# Patient Record
Sex: FEMALE | Race: BLACK OR AFRICAN AMERICAN | URBAN - METROPOLITAN AREA
[De-identification: names, ages, dates, MRNs, and addresses within clinical notes are randomized per-mention and may not be internally consistent; named-entity substitution may affect disease eponyms.]

---

## 2019-06-14 ENCOUNTER — HOSPITAL ENCOUNTER (OUTPATIENT)
Dept: MEDSURG UNIT | Facility: HOSPITAL | Age: 54
End: 2019-06-15
Attending: INTERNAL MEDICINE | Admitting: INTERNAL MEDICINE

## 2019-06-14 LAB
ABS NEUT (OLG): 6.26 X10(3)/MCL (ref 2.1–9.2)
ALBUMIN SERPL-MCNC: 4 GM/DL (ref 3.4–5)
ALBUMIN/GLOB SERPL: 0.9 RATIO (ref 1.1–2)
ALP SERPL-CCNC: 82 UNIT/L (ref 38–126)
ALT SERPL-CCNC: 27 UNIT/L (ref 12–78)
AST SERPL-CCNC: 45 UNIT/L (ref 15–37)
BASOPHILS # BLD AUTO: 0 X10(3)/MCL (ref 0–0.2)
BASOPHILS NFR BLD AUTO: 0 %
BILIRUB SERPL-MCNC: 0.4 MG/DL (ref 0.2–1)
BILIRUBIN DIRECT+TOT PNL SERPL-MCNC: 0.1 MG/DL (ref 0–0.5)
BILIRUBIN DIRECT+TOT PNL SERPL-MCNC: 0.3 MG/DL (ref 0–0.8)
BNP BLD-MCNC: 5 PG/ML (ref 0–125)
BUN SERPL-MCNC: 17 MG/DL (ref 7–18)
CALCIUM SERPL-MCNC: 9.3 MG/DL (ref 8.5–10.1)
CHLORIDE SERPL-SCNC: 105 MMOL/L (ref 98–107)
CO2 SERPL-SCNC: 26 MMOL/L (ref 21–32)
CREAT SERPL-MCNC: 0.8 MG/DL (ref 0.55–1.02)
EOSINOPHIL # BLD AUTO: 0.2 X10(3)/MCL (ref 0–0.9)
EOSINOPHIL NFR BLD AUTO: 2 %
ERYTHROCYTE [DISTWIDTH] IN BLOOD BY AUTOMATED COUNT: 14.6 % (ref 11.5–17)
GLOBULIN SER-MCNC: 4.3 GM/DL (ref 2.4–3.5)
GLUCOSE SERPL-MCNC: 105 MG/DL (ref 74–106)
HCT VFR BLD AUTO: 37.6 % (ref 37–47)
HGB BLD-MCNC: 11.6 GM/DL (ref 12–16)
LIPASE SERPL-CCNC: 142 UNIT/L (ref 73–393)
LYMPHOCYTES # BLD AUTO: 3.1 X10(3)/MCL (ref 0.6–4.6)
LYMPHOCYTES NFR BLD AUTO: 31 %
MCH RBC QN AUTO: 29.9 PG (ref 27–31)
MCHC RBC AUTO-ENTMCNC: 30.9 GM/DL (ref 33–36)
MCV RBC AUTO: 96.9 FL (ref 80–94)
MONOCYTES # BLD AUTO: 0.5 X10(3)/MCL (ref 0.1–1.3)
MONOCYTES NFR BLD AUTO: 5 %
NEUTROPHILS # BLD AUTO: 6.26 X10(3)/MCL (ref 2.1–9.2)
NEUTROPHILS NFR BLD AUTO: 62 %
PLATELET # BLD AUTO: 319 X10(3)/MCL (ref 130–400)
PMV BLD AUTO: 9.5 FL (ref 9.4–12.4)
POC TROPONIN: 0 NG/ML (ref 0–0.08)
POTASSIUM SERPL-SCNC: 4 MMOL/L (ref 3.5–5.1)
PROT SERPL-MCNC: 8.3 GM/DL (ref 6.4–8.2)
RBC # BLD AUTO: 3.88 X10(6)/MCL (ref 4.2–5.4)
SODIUM SERPL-SCNC: 137 MMOL/L (ref 136–145)
TROPONIN I SERPL-MCNC: <0.02 NG/ML (ref 0.02–0.49)
WBC # SPEC AUTO: 10.1 X10(3)/MCL (ref 4.5–11.5)

## 2022-04-30 NOTE — ED PROVIDER NOTES
Patient:   Eneida Hewitt             MRN: [id number]            FIN: [id number]               Age:   53 years     Sex:  Female     :  1965   Associated Diagnoses:   Chest pain, atypical; SOB (shortness of breath); Thymoma   Author:   Damaso Shen      Basic Information   Time seen: Date & time 2019 21:55:00.   History source: Patient.   Arrival mode: Private vehicle.   History limitation: None.   Additional information: Chief Complaint from Nursing Triage Note : Chief Complaint   2019 20:21 CDT      Chief Complaint           pt c/o epigastric pain radiating under l breast that began around 1830. denies cardiax hx. also reports nausea and sob.  .      History of Present Illness   The patient presents with difficulty breathing.  The onset was just prior to arrival.  The course/duration of symptoms is fluctuating in intensity.  Degree at onset moderate.  Degree at present moderate.  The Exacerbating factors is none.  The Relieving factors is none.  Risk factors consist of none.  Prior episodes: none.  Therapy today: none.  Associated symptoms: chest pain and back pain.        Review of Systems   Constitutional symptoms:  No fever, no chills.    Respiratory symptoms:  Shortness of breath, No cough,    Cardiovascular symptoms:  Chest pain, No palpitations,    Gastrointestinal symptoms:  No vomiting, no diarrhea.    Musculoskeletal symptoms:  Back pain.   Neurologic symptoms:  No altered level of consciousness, no weakness.              Additional review of systems information: All other systems reviewed and otherwise negative.      Health Status   Allergies:    Allergic Reactions (Selected)  No Known Allergies.   Medications: Per nurse's notes.   Immunizations: Per nurse's notes.   Menstrual history: Per nurse's notes.      Past Medical/ Family/ Social History   Medical history:    No active or resolved past medical history items have been selected or recorded., Reviewed as  documented in chart.   Surgical history:    No active procedure history items have been selected or recorded., Reviewed as documented in chart.   Family history:    No family history items have been selected or recorded., Reviewed as documented in chart.   Social history: Reviewed as documented in chart.   Problem list: Per nurse's notes.      Physical Examination               Vital Signs   Vital Signs   6/14/2019 21:31 CDT      Peripheral Pulse Rate     114 bpm  HI                             Respiratory Rate          21 br/min                             SpO2                      97 %                             Oxygen Therapy            Room air                             Systolic Blood Pressure   147 mmHg  HI                             Diastolic Blood Pressure  84 mmHg                             Mean Arterial Pressure, Cuff              105 mmHg    6/14/2019 20:21 CDT      Temperature Temporal Artery               36.8 DegC                             Peripheral Pulse Rate     116 bpm  HI                             Respiratory Rate          20 br/min                             SpO2                      100 %                             Oxygen Therapy            Room air                             Systolic Blood Pressure   148 mmHg  HI                             Diastolic Blood Pressure  92 mmHg  HI  .   General:  Alert, no acute distress, well hydrated, Skin: Normal for ethnicity.    Skin:  Warm, dry, pink, intact.    Head:  Normocephalic.   Neck:  Supple, no tenderness, full range of motion.    Eye:  Pupils are equal, round and reactive to light, extraocular movements are intact, normal conjunctiva.    Ears, nose, mouth and throat:  Oral mucosa moist.   Cardiovascular:  Regular rate and rhythm, No murmur, Normal peripheral perfusion.    Respiratory:  Lungs are clear to auscultation, breath sounds are equal, Respirations: not tachypneic, not labored, not shallow, Retractions: None.    Chest wall:  No  tenderness.   Back:  Normal range of motion, Normal alignment, No costovertebral angle tenderness,    Musculoskeletal:  Normal ROM, normal strength, no swelling, no deformity.    Gastrointestinal:  Soft, Nontender, Non distended, Normal bowel sounds.    Neurological:  Alert and oriented to person, place, time, and situation, No focal neurological deficit observed, normal sensory observed, normal motor observed, normal speech observed, normal coordination observed, Gait: Normal.    Psychiatric:  Cooperative, appropriate mood & affect, normal judgment.       Medical Decision Making   Documents reviewed:  Emergency department nurses' notes.   Orders  Launch Orders   Radiology:  CT Angio Chest PE W Contrast (Order): Stat, 6/14/2019 22:14 CDT, Pulmonary Embolism, None, Ambulatory, Rad Type, Schedule this test, [facility], [facility], Launch Orders   Pharmacy:  NS (0.9% Sodium Chloride) Infusion 1000 mL (Order): 1,000 mL, 1,000 mL, IV, 1,000 mL/hr, start date 6/14/2019 23:28 CDT, Launch Orders   Pharmacy:  Lopressor 1 mg/mL injectable solution (Order): 5 mg, form: Injection, IV Push, Once, first dose 6/15/2019 0:37 CDT, stop date 6/15/2019 0:37 CDT, STAT.    Electrocardiogram:  Time 6/14/2019 20:23:00, rate 118, normal sinus rhythm, No ST-T changes, no ectopy, normal MT & QRS intervals, EP Interp.    Results review:  Lab results : Lab View   6/14/2019 23:47 CDT      POC Troponin              0.00 ng/mL    6/14/2019 20:24 CDT      Sodium Lvl                137 mmol/L                             Potassium Lvl             4.0 mmol/L                             Chloride                  105 mmol/L                             CO2                       26.0 mmol/L                             Calcium Lvl               9.3 mg/dL                             Glucose Lvl               105 mg/dL                             BUN                       17.0 mg/dL                             Creatinine                0.80  mg/dL                             eGFR-AA                   >60 mL/min/1.73 m2  NA                             eGFR-ZORAN                  >60 mL/min/1.73 m2  NA                             Bili Total                0.4 mg/dL                             Bili Direct               0.10 mg/dL                             Bili Indirect             0.30 mg/dL                             AST                       45 unit/L  HI                             ALT                       27 unit/L                             Alk Phos                  82 unit/L                             Total Protein             8.3 gm/dL  HI                             Albumin Lvl               4.00 gm/dL                             Globulin                  4.30 gm/dL  HI                             A/G Ratio                 0.9 ratio  LOW                             BNP                       5 pg/mL                             Lipase Lvl                142 unit/L                             Troponin-I                <0.02 ng/mL  LOW                             WBC                       10.1 x10(3)/mcL                             RBC                       3.88 x10(6)/mcL  LOW                             Hgb                       11.6 gm/dL  LOW                             Hct                       37.6 %                             Platelet                  319 x10(3)/mcL                             MCV                       96.9 fL  HI                             MCH                       29.9 pg                             MCHC                      30.9 gm/dL  LOW                             RDW                       14.6 %                             MPV                       9.5 fL                             Abs Neut                  6.26 x10(3)/mcL                             Neutro Auto               62 %  NA                             Lymph Auto                31 %  NA                             Mono Auto                 5 %  NA                              Eos Auto                  2 %  NA                             Abs Eos                   0.2 x10(3)/mcL                             Basophil Auto             0 %  NA                             Abs Neutro                6.26 x10(3)/mcL                             Abs Lymph                 3.1 x10(3)/mcL                             Abs Mono                  0.5 x10(3)/mcL                             Abs Baso                  0.0 x10(3)/mcL  .   Radiology results:  Reported at  6/15/2019 00:16:00, Computed tomography, PE, reviewed radiologist's report, reveals no acute disease process.       Impression and Plan   Diagnosis   Chest pain, atypical (MQL15-YL R07.89)   SOB (shortness of breath) (NBZ30-LA R06.02)   Thymoma (SIM62-BZ D15.0)      Calls-Consults   -  6/15/2019 02:08:00 , Luis REDMAN, Shawn HERRERA, recommends Spoke to lili, accepts admit.    Plan   Condition: Stable.    Disposition: Admit time  6/15/2019 02:13:00, Place in Observation Telemetry Unit.    Patient was given the following educational materials   Follow up with   Counseled: Patient, Regarding diagnosis, Regarding diagnostic results, Regarding treatment plan, Patient indicated understanding of instructions.    Orders: Launch Orders   Admit/Transfer/Discharge:  Admit to Outpatient Observation (Order): 6/15/2019 2:14 CDT, Remote Telemetry Luis REDMAN, Shawn HERRERA, No.    Notes: Discussed with Dr Lazar and patient to be admitted.

## 2024-03-21 DIAGNOSIS — M25.561 RIGHT KNEE PAIN: Primary | ICD-10-CM

## 2024-03-21 DIAGNOSIS — M25.512 LEFT SHOULDER PAIN: ICD-10-CM

## 2025-06-25 ENCOUNTER — OFFICE VISIT (OUTPATIENT)
Dept: FAMILY MEDICINE | Facility: CLINIC | Age: 60
End: 2025-06-25
Payer: MEDICAID

## 2025-06-25 VITALS
OXYGEN SATURATION: 98 % | SYSTOLIC BLOOD PRESSURE: 135 MMHG | DIASTOLIC BLOOD PRESSURE: 80 MMHG | HEART RATE: 104 BPM | WEIGHT: 207 LBS | HEIGHT: 61 IN | RESPIRATION RATE: 18 BRPM | TEMPERATURE: 98 F | BODY MASS INDEX: 39.08 KG/M2

## 2025-06-25 DIAGNOSIS — I10 PRIMARY HYPERTENSION: ICD-10-CM

## 2025-06-25 DIAGNOSIS — Z00.00 ENCOUNTER FOR WELLNESS EXAMINATION: ICD-10-CM

## 2025-06-25 DIAGNOSIS — M54.50 CHRONIC BILATERAL LOW BACK PAIN WITHOUT SCIATICA: ICD-10-CM

## 2025-06-25 DIAGNOSIS — E78.2 MIXED HYPERLIPIDEMIA: ICD-10-CM

## 2025-06-25 DIAGNOSIS — I10 ESSENTIAL (PRIMARY) HYPERTENSION: Primary | ICD-10-CM

## 2025-06-25 DIAGNOSIS — G89.29 CHRONIC BILATERAL LOW BACK PAIN WITHOUT SCIATICA: ICD-10-CM

## 2025-06-25 LAB
25(OH)D3+25(OH)D2 SERPL-MCNC: 38 NG/ML (ref 30–80)
ABS NEUT CALC (OHS): 4.59 X10(3)/MCL (ref 2.1–9.2)
ALBUMIN SERPL-MCNC: 3.8 G/DL (ref 3.5–5)
ALBUMIN/GLOB SERPL: 0.8 RATIO (ref 1.1–2)
ALP SERPL-CCNC: 90 UNIT/L (ref 40–150)
ALT SERPL-CCNC: 11 UNIT/L (ref 0–55)
ANION GAP SERPL CALC-SCNC: 7 MEQ/L
AST SERPL-CCNC: 18 UNIT/L (ref 11–45)
BACTERIA #/AREA URNS AUTO: ABNORMAL /HPF
BASOPHILS NFR BLD MANUAL: 0.08 X10(3)/MCL (ref 0–0.2)
BASOPHILS NFR BLD MANUAL: 1 % (ref 0–2)
BILIRUB SERPL-MCNC: 0.4 MG/DL
BILIRUB UR QL STRIP.AUTO: NEGATIVE
BUN SERPL-MCNC: 12.3 MG/DL (ref 9.8–20.1)
CALCIUM SERPL-MCNC: 9.5 MG/DL (ref 8.4–10.2)
CAOX CRY UR QL COMP ASSIST: ABNORMAL
CHLORIDE SERPL-SCNC: 105 MMOL/L (ref 98–107)
CHOLEST SERPL-MCNC: 274 MG/DL
CHOLEST/HDLC SERPL: 4 {RATIO} (ref 0–5)
CLARITY UR: ABNORMAL
CO2 SERPL-SCNC: 29 MMOL/L (ref 22–29)
COLOR UR AUTO: YELLOW
CREAT SERPL-MCNC: 0.79 MG/DL (ref 0.55–1.02)
CREAT/UREA NIT SERPL: 16
EOSINOPHIL NFR BLD MANUAL: 0.08 X10(3)/MCL (ref 0–0.9)
EOSINOPHIL NFR BLD MANUAL: 1 % (ref 0–8)
ERYTHROCYTE [DISTWIDTH] IN BLOOD BY AUTOMATED COUNT: 14.6 % (ref 11.5–17)
EST. AVERAGE GLUCOSE BLD GHB EST-MCNC: 96.8 MG/DL
GFR SERPLBLD CREATININE-BSD FMLA CKD-EPI: >60 ML/MIN/1.73/M2
GLOBULIN SER-MCNC: 4.5 GM/DL (ref 2.4–3.5)
GLUCOSE SERPL-MCNC: 87 MG/DL (ref 74–100)
GLUCOSE UR QL STRIP: NORMAL
HAV IGM SERPL QL IA: NONREACTIVE
HBA1C MFR BLD: 5 %
HBV CORE IGM SERPL QL IA: NONREACTIVE
HBV SURFACE AG SERPL QL IA: NONREACTIVE
HCT VFR BLD AUTO: 37.2 % (ref 37–47)
HCV AB SERPL QL IA: NONREACTIVE
HDLC SERPL-MCNC: 65 MG/DL (ref 35–60)
HGB BLD-MCNC: 11.6 G/DL (ref 12–16)
HGB UR QL STRIP: NEGATIVE
HIV 1+2 AB+HIV1 P24 AG SERPL QL IA: NONREACTIVE
HYALINE CASTS #/AREA URNS LPF: >20 /LPF
KETONES UR QL STRIP: ABNORMAL
LDLC SERPL CALC-MCNC: 196 MG/DL (ref 50–140)
LEUKOCYTE ESTERASE UR QL STRIP: 500
LYMPHOCYTES NFR BLD MANUAL: 2.75 X10(3)/MCL (ref 0.6–4.6)
LYMPHOCYTES NFR BLD MANUAL: 36 % (ref 13–40)
MCH RBC QN AUTO: 30.1 PG (ref 27–31)
MCHC RBC AUTO-ENTMCNC: 31.2 G/DL (ref 33–36)
MCV RBC AUTO: 96.4 FL (ref 80–94)
MONOCYTES NFR BLD MANUAL: 0.15 X10(3)/MCL (ref 0.1–1.3)
MONOCYTES NFR BLD MANUAL: 2 % (ref 2–11)
MUCOUS THREADS URNS QL MICRO: ABNORMAL /LPF
NEUTROPHILS NFR BLD MANUAL: 60 % (ref 47–80)
NITRITE UR QL STRIP: NEGATIVE
NRBC BLD AUTO-RTO: 0 %
PH UR STRIP: 6.5 [PH]
PLATELET # BLD AUTO: 351 X10(3)/MCL (ref 130–400)
PLATELET # BLD EST: ADEQUATE 10*3/UL
PLATELETS.RETICULATED NFR BLD AUTO: 4 % (ref 0.9–11.2)
PMV BLD AUTO: 9.9 FL (ref 7.4–10.4)
POTASSIUM SERPL-SCNC: 3.4 MMOL/L (ref 3.5–5.1)
PROT SERPL-MCNC: 8.3 GM/DL (ref 6.4–8.3)
PROT UR QL STRIP: ABNORMAL
RBC # BLD AUTO: 3.86 X10(6)/MCL (ref 4.2–5.4)
RBC #/AREA URNS AUTO: ABNORMAL /HPF
RBC MORPH BLD: NORMAL
SODIUM SERPL-SCNC: 141 MMOL/L (ref 136–145)
SP GR UR STRIP.AUTO: 1.03 (ref 1–1.03)
SQUAMOUS #/AREA URNS LPF: ABNORMAL /HPF
T PALLIDUM AB SER QL: NONREACTIVE
T4 FREE SERPL-MCNC: 1.01 NG/DL (ref 0.7–1.48)
TRICHOMONAS UR QL COMP ASSIST: ABNORMAL /HPF
TRIGL SERPL-MCNC: 67 MG/DL (ref 37–140)
TSH SERPL-ACNC: 0.61 UIU/ML (ref 0.35–4.94)
UROBILINOGEN UR STRIP-ACNC: NORMAL
VIT B12 SERPL-MCNC: 1024 PG/ML (ref 213–816)
VLDLC SERPL CALC-MCNC: 13 MG/DL
WBC # BLD AUTO: 7.65 X10(3)/MCL (ref 4.5–11.5)
WBC #/AREA URNS AUTO: ABNORMAL /HPF

## 2025-06-25 PROCEDURE — 84439 ASSAY OF FREE THYROXINE: CPT | Performed by: NURSE PRACTITIONER

## 2025-06-25 PROCEDURE — 80061 LIPID PANEL: CPT | Performed by: NURSE PRACTITIONER

## 2025-06-25 PROCEDURE — 1159F MED LIST DOCD IN RCRD: CPT | Mod: CPTII,,, | Performed by: NURSE PRACTITIONER

## 2025-06-25 PROCEDURE — 1160F RVW MEDS BY RX/DR IN RCRD: CPT | Mod: CPTII,,, | Performed by: NURSE PRACTITIONER

## 2025-06-25 PROCEDURE — 85027 COMPLETE CBC AUTOMATED: CPT | Performed by: NURSE PRACTITIONER

## 2025-06-25 PROCEDURE — 80053 COMPREHEN METABOLIC PANEL: CPT | Performed by: NURSE PRACTITIONER

## 2025-06-25 PROCEDURE — 99214 OFFICE O/P EST MOD 30 MIN: CPT | Mod: 25,S$PBB,, | Performed by: NURSE PRACTITIONER

## 2025-06-25 PROCEDURE — 99386 PREV VISIT NEW AGE 40-64: CPT | Mod: S$PBB,,, | Performed by: NURSE PRACTITIONER

## 2025-06-25 PROCEDURE — 99214 OFFICE O/P EST MOD 30 MIN: CPT | Mod: PBBFAC,PN | Performed by: NURSE PRACTITIONER

## 2025-06-25 PROCEDURE — 80074 ACUTE HEPATITIS PANEL: CPT | Performed by: NURSE PRACTITIONER

## 2025-06-25 PROCEDURE — 86780 TREPONEMA PALLIDUM: CPT | Performed by: NURSE PRACTITIONER

## 2025-06-25 PROCEDURE — 82607 VITAMIN B-12: CPT | Performed by: NURSE PRACTITIONER

## 2025-06-25 PROCEDURE — 83036 HEMOGLOBIN GLYCOSYLATED A1C: CPT | Performed by: NURSE PRACTITIONER

## 2025-06-25 PROCEDURE — 3075F SYST BP GE 130 - 139MM HG: CPT | Mod: CPTII,,, | Performed by: NURSE PRACTITIONER

## 2025-06-25 PROCEDURE — 87389 HIV-1 AG W/HIV-1&-2 AB AG IA: CPT | Performed by: NURSE PRACTITIONER

## 2025-06-25 PROCEDURE — 81001 URINALYSIS AUTO W/SCOPE: CPT | Performed by: NURSE PRACTITIONER

## 2025-06-25 PROCEDURE — 3079F DIAST BP 80-89 MM HG: CPT | Mod: CPTII,,, | Performed by: NURSE PRACTITIONER

## 2025-06-25 PROCEDURE — 3044F HG A1C LEVEL LT 7.0%: CPT | Mod: CPTII,,, | Performed by: NURSE PRACTITIONER

## 2025-06-25 PROCEDURE — 82306 VITAMIN D 25 HYDROXY: CPT | Performed by: NURSE PRACTITIONER

## 2025-06-25 PROCEDURE — 84443 ASSAY THYROID STIM HORMONE: CPT | Performed by: NURSE PRACTITIONER

## 2025-06-25 PROCEDURE — 3008F BODY MASS INDEX DOCD: CPT | Mod: CPTII,,, | Performed by: NURSE PRACTITIONER

## 2025-06-25 RX ORDER — ATORVASTATIN CALCIUM 20 MG/1
20 TABLET, FILM COATED ORAL DAILY
COMMUNITY
Start: 2025-05-20 | End: 2025-06-25 | Stop reason: SDUPTHER

## 2025-06-25 RX ORDER — ATORVASTATIN CALCIUM 20 MG/1
20 TABLET, FILM COATED ORAL DAILY
Qty: 90 TABLET | Refills: 3 | Status: SHIPPED | OUTPATIENT
Start: 2025-06-25

## 2025-06-25 RX ORDER — AMLODIPINE BESYLATE 5 MG/1
5 TABLET ORAL DAILY
Qty: 90 TABLET | Refills: 3 | Status: SHIPPED | OUTPATIENT
Start: 2025-06-25

## 2025-06-25 RX ORDER — METOPROLOL SUCCINATE 25 MG/1
25 TABLET, EXTENDED RELEASE ORAL DAILY
COMMUNITY
Start: 2025-05-20 | End: 2025-06-25 | Stop reason: SDUPTHER

## 2025-06-25 RX ORDER — METOPROLOL SUCCINATE 25 MG/1
25 TABLET, EXTENDED RELEASE ORAL DAILY
Qty: 90 TABLET | Refills: 3 | Status: SHIPPED | OUTPATIENT
Start: 2025-06-25

## 2025-06-25 RX ORDER — AMLODIPINE BESYLATE 5 MG/1
5 TABLET ORAL DAILY
COMMUNITY
Start: 2025-05-20 | End: 2025-06-25 | Stop reason: SDUPTHER

## 2025-06-25 NOTE — ASSESSMENT & PLAN NOTE
Refill lipitor today; chronic issue. FLP today  -Education: Importance of dietary modifications.   Follow a low cholesterol, low saturated fat diet with less that 200mg of cholesterol a day.  Avoid fried foods and high saturated fats (high saturated fats less than 7% of calories).  Add Flax Seed/Fish Oil supplements to diet. Increase dietary fiber.  -Regular exercise can reduce LDL and raise HDL. Engage in physical activity 5 times per week for 30 minutes per day.

## 2025-06-25 NOTE — ASSESSMENT & PLAN NOTE
Refill Norvasc, Metoprolol. Chronic, stable issue.   -Low Sodium Diet (Dash Diet - less than 2 grams of sodium per day).  -Monitor Blood Pressure daily and log. Report any consistent numbers greater than 140/90.  -Smoking Cessation encouraged to aid in BP reduction.  -Maintain healthy weight with goal BMI <30. Exercise 30 minutes per day 5 days per week

## 2025-06-25 NOTE — PROGRESS NOTES
[facility]      Patient Name: Eneida Hewitt     : 1965    MRN: [id number]     Subjective:     Patient ID: Eneida Hewitt is a 59 y.o. female.    Chief Complaint:   Chief Complaint   Patient presents with    Establish Care     Pt is here to establish care with PCP. Pt reports h/o back pain s/p MRI, HTN, Diabetes. Pt requesting medication refills        HPI: 25: Pt is here to establish care with PCP. Pt reports h/o back pain s/p MRI, HTN and HLD. Pt requesting medication refills of Metoprolol, Amlodipine and Lipitor. Previously seen by Meghan and wants to switch. Claims she does not know why she is on the medication she takes.  She has long hx of back problems since childhood.  She has scoliosis and suffers with lower back pain.  Has had imaging in the past and feels as though she would like to have injections in her spine to help with pain.   States her PAP, MMG and CRC are all UTD.  BP is stable.  She takes cholesterol meds and needs refills.            ROS:      Review of Systems   Musculoskeletal:  Positive for back pain, myalgias and neck pain.   All other systems reviewed and are negative.        History:     Health Maintenance         Date Due Completion Date    Cervical Cancer Screening Never done ---    Mammogram Never done ---    Colorectal Cancer Screening Never done ---    TETANUS VACCINE 2026 (Originally 2014) 2004    Shingles Vaccine (1 of 2) 2026 (Originally 2015) ---    COVID-19 Vaccine (3 -  season) 2026 (Originally 2024) 12/15/2021    Pneumococcal Vaccines (Age 50+) (1 of 1 - PCV) 2026 (Originally 2015) ---    Influenza Vaccine (Season Ended) 2025 ---    High Dose Statin 2026    Hemoglobin A1c (Diabetic Prevention Screening) 2028    Lipid Panel 2030    RSV Vaccine (Age 60+ and Pregnant patients) (1 - 1-dose 75+ series) 2040 ---       "      Past Medical History:   Diagnosis Date    Hyperlipidemia     Hypertension         Past Surgical History:   Procedure Laterality Date    TUBAL LIGATION  06/01/1991       Family History   Problem Relation Name Age of Onset    Hypertension Mother      Hypertension Father          Social History     Tobacco Use    Smoking status: Never    Smokeless tobacco: Never   Substance and Sexual Activity    Alcohol use: Never    Drug use: Never    Sexual activity: Not Currently       Current Outpatient Medications   Medication Instructions    amLODIPine (NORVASC) 5 mg, Oral, Daily    atorvastatin (LIPITOR) 20 mg, Oral, Daily    metoprolol succinate (TOPROL-XL) 25 mg, Oral, Daily        Review of patient's allergies indicates:  No Known Allergies    Patient Care Team:  Meme Vides FNP as PCP - General (Family Medicine)    Objective:     Visit Vitals  /80   Pulse 104   Temp 98 °F (36.7 °C) (Oral)   Resp 18   Ht 5' 1" (1.549 m)   Wt 93.9 kg (207 lb)   SpO2 98%   BMI 39.11 kg/m²       Physical Examination:     Physical Exam  Vitals and nursing note reviewed.   Constitutional:       General: She is awake.      Appearance: Normal appearance. She is well-developed and well-groomed.   HENT:      Head: Normocephalic and atraumatic.      Right Ear: Hearing, tympanic membrane, ear canal and external ear normal.      Left Ear: Hearing, tympanic membrane, ear canal and external ear normal.      Nose: Nose normal.      Mouth/Throat:      Lips: Pink.      Mouth: Mucous membranes are moist.      Tongue: No lesions.      Pharynx: Oropharynx is clear. No posterior oropharyngeal erythema.   Eyes:      General: Lids are normal. Vision grossly intact.      Extraocular Movements: Extraocular movements intact.      Conjunctiva/sclera: Conjunctivae normal.      Pupils: Pupils are equal, round, and reactive to light.   Neck:      Thyroid: No thyroid mass.      Vascular: No carotid bruit.      Trachea: Trachea and phonation normal. "   Cardiovascular:      Rate and Rhythm: Normal rate and regular rhythm.      Pulses: Normal pulses.      Heart sounds: Normal heart sounds, S1 normal and S2 normal.   Pulmonary:      Effort: Pulmonary effort is normal.      Breath sounds: Normal breath sounds. No decreased breath sounds, wheezing, rhonchi or rales.   Abdominal:      General: Abdomen is flat. Bowel sounds are normal.      Palpations: Abdomen is soft.      Tenderness: There is no abdominal tenderness.   Musculoskeletal:         General: Normal range of motion.      Cervical back: Full passive range of motion without pain and normal range of motion.      Right lower leg: No edema.      Left lower leg: No edema.   Lymphadenopathy:      Cervical: No cervical adenopathy.   Skin:     General: Skin is warm and dry.      Capillary Refill: Capillary refill takes less than 2 seconds.   Neurological:      General: No focal deficit present.      Mental Status: She is alert and oriented to person, place, and time.      GCS: GCS eye subscore is 4. GCS verbal subscore is 5. GCS motor subscore is 6.      Cranial Nerves: Cranial nerves 2-12 are intact.      Sensory: Sensation is intact.      Motor: Motor function is intact.      Coordination: Coordination is intact.      Gait: Gait is intact.   Psychiatric:         Attention and Perception: Attention and perception normal.         Mood and Affect: Mood normal.         Speech: Speech normal.         Behavior: Behavior normal. Behavior is cooperative.         Thought Content: Thought content normal.         Cognition and Memory: Cognition and memory normal.         Lab Results:     Chemistry:  Lab Results   Component Value Date     06/25/2025    K 3.4 (L) 06/25/2025    BUN 12.3 06/25/2025    CREATININE 0.79 06/25/2025    EGFRNORACEVR >60 06/25/2025    CALCIUM 9.5 06/25/2025    ALKPHOS 90 06/25/2025    ALBUMIN 3.8 06/25/2025    BILIDIR 0.10 06/14/2019    IBILI 0.30 06/14/2019    AST 18 06/25/2025    ALT 11  "06/25/2025    XNCEABFS35BN 38 06/25/2025    TSH 0.610 06/25/2025    IQEZVQ2SGUD 1.01 06/25/2025        Lab Results   Component Value Date    HGBA1C 5.0 06/25/2025        Hematology:  Lab Results   Component Value Date    WBC 7.65 06/25/2025    HGB 11.6 (L) 06/25/2025    HCT 37.2 06/25/2025     06/25/2025       Lipid Panel:  Lab Results   Component Value Date    CHOL 274 (H) 06/25/2025    HDL 65 (H) 06/25/2025    .00 (H) 06/25/2025    TRIG 67 06/25/2025    TOTALCHOLEST 4 06/25/2025        Urine:  Lab Results   Component Value Date    APPEARANCEUA Turbid (A) 06/25/2025    SGUA 1.027 06/25/2025    PROTEINUA 1+ (A) 06/25/2025    KETONESUA 1+ (A) 06/25/2025    LEUKOCYTESUR 500 (A) 06/25/2025    RBCUA 0-5 06/25/2025    WBCUA 11-20 (A) 06/25/2025    BACTERIA Trace (A) 06/25/2025    SQEPUA Many (A) 06/25/2025    HYALINECASTS >20 (A) 06/25/2025        Assessment:          ICD-10-CM ICD-9-CM   1. Essential (primary) hypertension  I10 401.9   2. Encounter for wellness examination  Z00.00 V70.0   3. Mixed hyperlipidemia  E78.2 272.2   4. Primary hypertension  I10 401.9   5. Chronic bilateral low back pain without sciatica  M54.50 724.2    G89.29 338.29          Plan:     1. Essential (primary) hypertension  -     amLODIPine (NORVASC) 5 MG tablet; Take 1 tablet (5 mg total) by mouth once daily.  Dispense: 90 tablet; Refill: 3  -     metoprolol succinate (TOPROL-XL) 25 MG 24 hr tablet; Take 1 tablet (25 mg total) by mouth once daily.  Dispense: 90 tablet; Refill: 3    2. Encounter for wellness examination  -     CBC Auto Differential  -     Comprehensive Metabolic Panel  -     Urinalysis  -     Hemoglobin A1C  -     TSH  -     T4, Free  -     Vitamin D  -     Vitamin B12  -     Hepatitis Panel, Acute  -     HIV 1/2 Ag/Ab (4th Gen)  -     SYPHILIS ANTIBODY (WITH REFLEX RPR)  -     Lipid Panel    3. Mixed hyperlipidemia  Overview:  No results found for: "CHOL"   No results found for: "HDL"  No results found for: " ""LDLCALC"   No results found for: "TRIG"  No results found for: "TOTALCHOLEST"  No results found for: "NONHDLCHOL"  No results found for: "CHOLHDL"      Assessment & Plan:  Refill lipitor today; chronic issue. FLP today  -Education: Importance of dietary modifications.   Follow a low cholesterol, low saturated fat diet with less that 200mg of cholesterol a day.  Avoid fried foods and high saturated fats (high saturated fats less than 7% of calories).  Add Flax Seed/Fish Oil supplements to diet. Increase dietary fiber.  -Regular exercise can reduce LDL and raise HDL. Engage in physical activity 5 times per week for 30 minutes per day.         Orders:  -     atorvastatin (LIPITOR) 20 MG tablet; Take 1 tablet (20 mg total) by mouth once daily.  Dispense: 90 tablet; Refill: 3    4. Primary hypertension  Overview:  BP Readings from Last 3 Encounters:   06/25/25 135/80         Assessment & Plan:  Refill Norvasc, Metoprolol. Chronic, stable issue.   -Low Sodium Diet (Dash Diet - less than 2 grams of sodium per day).  -Monitor Blood Pressure daily and log. Report any consistent numbers greater than 140/90.  -Smoking Cessation encouraged to aid in BP reduction.  -Maintain healthy weight with goal BMI <30. Exercise 30 minutes per day 5 days per week        5. Chronic bilateral low back pain without sciatica  Assessment & Plan:  Obtain records including MRI results from [facility].  Pt to return in 2 weeks to review labs and if MRI is present, will review and refer as appropriate at that time.              Follow up in about 2 weeks (around 7/9/2025) for Review of labs..  In addition to their scheduled follow up, the patient has also been instructed to follow up on as needed basis.       Future Appointments   Date Time Provider Department Center   7/17/2025  1:00 PM Meme Vides FNP [facility]        [facility]        "

## 2025-06-25 NOTE — PATIENT INSTRUCTIONS
Miguel Monique,     If you are due for any health screening(s) below please notify me so we can arrange them to be ordered and scheduled. Most healthy patients at your age complete them, but you are free to accept or refuse.     If you can't do it, I'll definitely understand. If you can, I'd certainly appreciate it!    Tests to Keep You Healthy    Mammogram: DUE  Colon Cancer Screening: DUE  Cervical Cancer Screening: DUE      Schedule your breast cancer screening today     Breast cancer is the second most common cancer in women,  and the second leading cause of death from cancer. Mammograms can detect breast cancer early, which significantly increases the chances of curing the cancer.       Our records indicate that you may be overdue for breast cancer screening. Cancer screenings save lives, so schedule yours today to stay healthy.     If you recently had a mammogram performed outside of [facility], please let your Health care team know so that they can update your health record.        Its time for your colon cancer screening     Colorectal cancer is one of the leading causes of cancer death for men and women but it doesnt have to be. Screenings can prevent colorectal cancer or find it early enough to treat and cure the disease.     Our records indicate that you may be overdue for colon cancer screening. A colonoscopy or stool screening test can help identify patients at risk for developing colon cancer. Cancer screenings save lives, so schedule yours today to stay healthy.     A colonoscopy is the preferred test for detecting colon cancer. It is needed only once every 10 years if results are negative. While you are sedated, a flexible, lighted tube with a tiny camera is inserted into the rectum and advanced through the colon to look for cancers.     An alternative screening test that is used at home and returned to the lab may also be used. It detects hidden blood in bowel movements which could indicate  cancer in the colon. If results are positive, you will need a colonoscopy to determine if the blood is a sign of cancer. This type of follow up (diagnostic) colonoscopy usually requires additional copays as required by your insurance provider.     If you recently had your colon cancer screening performed outside of [facility], please let your Health care team know so that they can update your health record. Please contact your PCP if you have any questions.    Your cervical cancer screening is due     Our records indicate that you may be overdue for your screening Pap smear. A Pap smear is an important health screening that can detect abnormal cells that can become cervical cancer. Cervical cancer screenings allow for early diagnosis and increase the likelihood of successful treatment.     The current recommendation for Pap smear screening is every 3-5 years for women at average risk. We encourage you to schedule your appointment with your womens health provider. Many women see a gynecologist for this screening, but some primary care providers also provide Pap screening.     If you recently had your Pap smear screening performed outside of [facility], please let your health care team know so that they can update your health record.

## 2025-06-26 PROBLEM — M54.50 CHRONIC BILATERAL LOW BACK PAIN WITHOUT SCIATICA: Status: ACTIVE | Noted: 2025-06-26

## 2025-06-26 PROBLEM — G89.29 CHRONIC BILATERAL LOW BACK PAIN WITHOUT SCIATICA: Status: ACTIVE | Noted: 2025-06-26

## 2025-06-26 NOTE — ASSESSMENT & PLAN NOTE
Obtain records including MRI results from [facility].  Pt to return in 2 weeks to review labs and if MRI is present, will review and refer as appropriate at that time.    Implemented All Universal Safety Interventions:  [place] to call system. Call bell, personal items and telephone within reach. Instruct patient to call for assistance. Room bathroom lighting operational. Non-slip footwear when patient is off stretcher. Physically safe environment: no spills, clutter or unnecessary equipment. Stretcher in lowest position, wheels locked, appropriate side rails in place.